# Patient Record
Sex: MALE | Race: WHITE
[De-identification: names, ages, dates, MRNs, and addresses within clinical notes are randomized per-mention and may not be internally consistent; named-entity substitution may affect disease eponyms.]

---

## 2018-11-13 ENCOUNTER — RX RENEWAL (OUTPATIENT)
Age: 63
End: 2018-11-13

## 2018-11-13 DIAGNOSIS — K21.9 GASTRO-ESOPHAGEAL REFLUX DISEASE W/OUT ESOPHAGITIS: ICD-10-CM

## 2018-11-14 RX ORDER — PANTOPRAZOLE 40 MG/1
40 TABLET, DELAYED RELEASE ORAL DAILY
Qty: 1 | Refills: 3 | Status: ACTIVE | COMMUNITY
Start: 2018-11-13 | End: 1900-01-01

## 2023-05-08 ENCOUNTER — NON-APPOINTMENT (OUTPATIENT)
Age: 68
End: 2023-05-08

## 2023-05-30 ENCOUNTER — APPOINTMENT (OUTPATIENT)
Dept: COLORECTAL SURGERY | Facility: CLINIC | Age: 68
End: 2023-05-30
Payer: MEDICARE

## 2023-05-30 VITALS
BODY MASS INDEX: 25.92 KG/M2 | WEIGHT: 175 LBS | DIASTOLIC BLOOD PRESSURE: 79 MMHG | HEART RATE: 70 BPM | HEIGHT: 69 IN | SYSTOLIC BLOOD PRESSURE: 139 MMHG

## 2023-05-30 PROCEDURE — 99204 OFFICE O/P NEW MOD 45 MIN: CPT | Mod: 25

## 2023-06-01 NOTE — REVIEW OF SYSTEMS
[As Noted in HPI] : as noted in HPI [Constipation] : constipation [Negative] : Heme/Lymph [FreeTextEntry7] : prolapsing and bleeding hemorrhoids.

## 2023-06-01 NOTE — PHYSICAL EXAM
[Abdomen Masses] : No abdominal masses [Abdomen Tenderness] : ~T No ~M abdominal tenderness [Manually Reducible] : a manually reducible (grade III) [Tender, Swollen] : tender, swollen [JVD] : no jugular venous distention  [Normal Breath Sounds] : Normal breath sounds [Normal Heart Sounds] : normal heart sounds [Normal Rate and Rhythm] : normal rate and rhythm [No Rash or Lesion] : No rash or lesion [Alert] : alert [Oriented to Person] : oriented to person [Oriented to Place] : oriented to place [Oriented to Time] : oriented to time [Calm] : calm [de-identified] : grade 3 [de-identified] : looks well nad

## 2023-06-01 NOTE — ASSESSMENT
[FreeTextEntry1] : 68 year old male with prolapsing and bleeding hemorrhoids. recommend rubber band ligation, high fiber diet, metamucil daily, sitz baths

## 2023-06-01 NOTE — HISTORY OF PRESENT ILLNESS
[FreeTextEntry1] : initial office visit for this 68 year old male with complaints of prolapsing and bleeding hemorrhoids for many years, symptoms have worsened.

## 2023-06-20 ENCOUNTER — APPOINTMENT (OUTPATIENT)
Dept: COLORECTAL SURGERY | Facility: CLINIC | Age: 68
End: 2023-06-20
Payer: MEDICARE

## 2023-06-20 PROCEDURE — 46221 LIGATION OF HEMORRHOID(S): CPT

## 2023-06-20 PROCEDURE — 99214 OFFICE O/P EST MOD 30 MIN: CPT | Mod: 25

## 2023-06-20 NOTE — HISTORY OF PRESENT ILLNESS
[FreeTextEntry1] : 68 year old male with complaints of prolapsing and bleeding hemorrhoids for many years, symptoms have worsened. underwent previous rubber band ligation with good results

## 2023-06-20 NOTE — PHYSICAL EXAM
[Abdomen Masses] : No abdominal masses [Abdomen Tenderness] : ~T No ~M abdominal tenderness [Manually Reducible] : a manually reducible (grade III) [Tender, Swollen] : tender, swollen [JVD] : no jugular venous distention  [Normal Breath Sounds] : Normal breath sounds [Normal Heart Sounds] : normal heart sounds [Normal Rate and Rhythm] : normal rate and rhythm [No Rash or Lesion] : No rash or lesion [Alert] : alert [Oriented to Person] : oriented to person [Oriented to Place] : oriented to place [Oriented to Time] : oriented to time [Calm] : calm [de-identified] : grade 3 [de-identified] : looks well nad

## 2023-07-18 ENCOUNTER — APPOINTMENT (OUTPATIENT)
Dept: COLORECTAL SURGERY | Facility: CLINIC | Age: 68
End: 2023-07-18
Payer: MEDICARE

## 2023-07-18 PROCEDURE — 46221 LIGATION OF HEMORRHOID(S): CPT

## 2023-07-18 PROCEDURE — 99214 OFFICE O/P EST MOD 30 MIN: CPT | Mod: 25

## 2023-07-18 NOTE — PHYSICAL EXAM
[Abdomen Masses] : No abdominal masses [Abdomen Tenderness] : ~T No ~M abdominal tenderness [Manually Reducible] : a manually reducible (grade III) [Tender, Swollen] : tender, swollen [JVD] : no jugular venous distention  [Normal Breath Sounds] : Normal breath sounds [Normal Heart Sounds] : normal heart sounds [Normal Rate and Rhythm] : normal rate and rhythm [No Rash or Lesion] : No rash or lesion [Alert] : alert [Oriented to Person] : oriented to person [Oriented to Place] : oriented to place [Oriented to Time] : oriented to time [Calm] : calm [de-identified] : grade 3 [de-identified] : looks well nad

## 2023-08-15 ENCOUNTER — APPOINTMENT (OUTPATIENT)
Dept: COLORECTAL SURGERY | Facility: CLINIC | Age: 68
End: 2023-08-15
Payer: MEDICARE

## 2023-08-15 DIAGNOSIS — K64.8 OTHER HEMORRHOIDS: ICD-10-CM

## 2023-08-15 DIAGNOSIS — K64.4 RESIDUAL HEMORRHOIDAL SKIN TAGS: ICD-10-CM

## 2023-08-15 DIAGNOSIS — K64.8 RESIDUAL HEMORRHOIDAL SKIN TAGS: ICD-10-CM

## 2023-08-15 PROCEDURE — 99214 OFFICE O/P EST MOD 30 MIN: CPT | Mod: 25

## 2023-08-15 PROCEDURE — 46221 LIGATION OF HEMORRHOID(S): CPT

## 2023-08-16 PROBLEM — K64.4 INTERNAL AND EXTERNAL BLEEDING HEMORRHOIDS: Status: ACTIVE | Noted: 2023-06-01

## 2023-08-16 PROBLEM — K64.8 INTERNAL AND EXTERNAL PROLAPSED HEMORRHOIDS: Status: ACTIVE | Noted: 2023-06-01

## 2023-08-16 NOTE — HISTORY OF PRESENT ILLNESS
[FreeTextEntry1] : 68 year old male with complaints of prolapsing and bleeding hemorrhoids for many years, symptoms have worsened. underwent previous rubber band ligation with good results. reports less bleeding

## 2023-08-16 NOTE — PHYSICAL EXAM
[Abdomen Masses] : No abdominal masses [Abdomen Tenderness] : ~T No ~M abdominal tenderness [Manually Reducible] : a manually reducible (grade III) [Tender, Swollen] : tender, swollen [JVD] : no jugular venous distention  [Normal Breath Sounds] : Normal breath sounds [Normal Heart Sounds] : normal heart sounds [Normal Rate and Rhythm] : normal rate and rhythm [No Rash or Lesion] : No rash or lesion [Alert] : alert [Oriented to Person] : oriented to person [Oriented to Place] : oriented to place [Oriented to Time] : oriented to time [Calm] : calm [de-identified] : grade 3 [de-identified] : looks well nad